# Patient Record
(demographics unavailable — no encounter records)

---

## 2025-05-29 NOTE — PROCEDURE
[de-identified] : PROCEDURE: Flexible laryngoscopy SURGEON: Dr. Partida INDICATIONS: He was unable to tolerate a mirror exam. Assess for laryngopharynx pharyngeal reflux. cough. head and neck mass. Verbal Consent obtained. ANESTHESIA: The patient was placed in a sitting position.  Following application of the topical anesthetic and decongestant, exam was performed with a flexible scope.  The scope was passed along the right nasal floor to the nasopharynx.  It was then passed into the region of the middle meatus, middle turbinate, and sphenoethmoid region.  An identical procedure was performed on the left side.  The following findings were noted:   The nasal musoca was healthy appearing and the septum was roughly midline. The middle meatus and sphenoethmoid recesses were clear bilaterally. The nasopharynx   Nasopharynx: no masses, choanae patent, no adenoid tissue   Base of tongue/vallecula: no masses or asymmetry Pharyngeal walls: symmetrical. No masses. Pyriform sinuses: no lesions or pooling of secretions. Epiglottis: normal. No edema or lesions. Aryepiglottic folds: normal. No lesions. Vocal cords: clear and mobile. No lesions. Airway patent. Arytenoids: no edema or erythema. Interarytenoid area: no edema, erythema or lesion.   Tolerated the procedure well.

## 2025-05-29 NOTE — HISTORY OF PRESENT ILLNESS
[de-identified] : Initial visit.  Previously known to me. Reports that in early April he developed what seemed like a URI. It lingered for a period of time. When he was not improving he was seen by internal medicine and recommended a course of antibiotic and Allegra-D.  Seem to help him some. At this point he has persistent rhinitis, postnasal drip and low-grade cough.

## 2025-05-29 NOTE — ASSESSMENT
[FreeTextEntry1] : No evidence of an infection. Recommend Flonase and Allegra. No further workup or treatment recommended at this time. Of note the patient is improving.